# Patient Record
Sex: FEMALE | Race: WHITE | NOT HISPANIC OR LATINO | URBAN - METROPOLITAN AREA
[De-identification: names, ages, dates, MRNs, and addresses within clinical notes are randomized per-mention and may not be internally consistent; named-entity substitution may affect disease eponyms.]

---

## 2017-07-25 ENCOUNTER — EMERGENCY (EMERGENCY)
Facility: HOSPITAL | Age: 22
LOS: 0 days | Discharge: LEFT BEFORE DISCHARGE | End: 2017-07-25
Admitting: EMERGENCY MEDICINE

## 2017-07-25 DIAGNOSIS — J02.9 ACUTE PHARYNGITIS, UNSPECIFIED: ICD-10-CM

## 2017-07-25 DIAGNOSIS — Z87.891 PERSONAL HISTORY OF NICOTINE DEPENDENCE: ICD-10-CM

## 2017-07-25 DIAGNOSIS — R45.0 NERVOUSNESS: ICD-10-CM

## 2017-07-25 DIAGNOSIS — R21 RASH AND OTHER NONSPECIFIC SKIN ERUPTION: ICD-10-CM

## 2017-08-14 ENCOUNTER — EMERGENCY (EMERGENCY)
Facility: HOSPITAL | Age: 22
LOS: 0 days | Discharge: HOME | End: 2017-08-14

## 2017-08-14 DIAGNOSIS — T81.4XXA INFECTION FOLLOWING A PROCEDURE, INITIAL ENCOUNTER: ICD-10-CM

## 2017-08-14 DIAGNOSIS — Z87.891 PERSONAL HISTORY OF NICOTINE DEPENDENCE: ICD-10-CM

## 2017-08-14 DIAGNOSIS — Z97.5 PRESENCE OF (INTRAUTERINE) CONTRACEPTIVE DEVICE: ICD-10-CM

## 2017-08-14 DIAGNOSIS — L53.9 ERYTHEMATOUS CONDITION, UNSPECIFIED: ICD-10-CM
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
## 2017-08-14 DIAGNOSIS — Z98.890 OTHER SPECIFIED POSTPROCEDURAL STATES: ICD-10-CM

## 2019-02-02 ENCOUNTER — EMERGENCY (EMERGENCY)
Facility: HOSPITAL | Age: 24
LOS: 0 days | Discharge: HOME | End: 2019-02-02
Attending: STUDENT IN AN ORGANIZED HEALTH CARE EDUCATION/TRAINING PROGRAM | Admitting: STUDENT IN AN ORGANIZED HEALTH CARE EDUCATION/TRAINING PROGRAM

## 2019-02-02 VITALS
HEART RATE: 94 BPM | SYSTOLIC BLOOD PRESSURE: 120 MMHG | OXYGEN SATURATION: 98 % | RESPIRATION RATE: 16 BRPM | TEMPERATURE: 98 F | DIASTOLIC BLOOD PRESSURE: 70 MMHG

## 2019-02-02 DIAGNOSIS — Z87.42 PERSONAL HISTORY OF OTHER DISEASES OF THE FEMALE GENITAL TRACT: ICD-10-CM
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
## 2019-02-02 DIAGNOSIS — T78.40XA ALLERGY, UNSPECIFIED, INITIAL ENCOUNTER: ICD-10-CM
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
## 2019-02-02 DIAGNOSIS — J34.89 OTHER SPECIFIED DISORDERS OF NOSE AND NASAL SINUSES: ICD-10-CM

## 2019-02-02 RX ORDER — DEXAMETHASONE 0.5 MG/5ML
10 ELIXIR ORAL ONCE
Qty: 0 | Refills: 0 | Status: COMPLETED | OUTPATIENT
Start: 2019-02-02 | End: 2019-02-02

## 2019-02-02 RX ORDER — POLYMYXIN B SULF/TRIMETHOPRIM 10000-1/ML
1 DROPS OPHTHALMIC (EYE) ONCE
Qty: 0 | Refills: 0 | Status: COMPLETED | OUTPATIENT
Start: 2019-02-02 | End: 2019-02-02

## 2019-02-02 RX ORDER — DIPHENHYDRAMINE HCL 50 MG
50 CAPSULE ORAL ONCE
Qty: 0 | Refills: 0 | Status: COMPLETED | OUTPATIENT
Start: 2019-02-02 | End: 2019-02-02

## 2019-02-02 RX ADMIN — Medication 50 MILLIGRAM(S): at 07:43

## 2019-02-02 RX ADMIN — Medication 10 MILLIGRAM(S): at 07:46

## 2019-02-02 RX ADMIN — Medication 1 DROP(S): at 07:48

## 2019-02-02 NOTE — ED PROVIDER NOTE - CARE PROVIDER_API CALL
Armando Díaz)  Ophthalmology  [address] [place] [address]  Phone: [phone]  Fax: [phone]

## 2019-02-02 NOTE — ED PROVIDER NOTE - ATTENDING CONTRIBUTION TO CARE
22 y/o F p/w atraumatic b/l upper eyelid swelling and erythema since this AM, s/p eyelash extension application yesterday.  + itchy feeling.  + white discharge, crusting. No blurry vision.  PERRL, EOMI, no visual changes, NL sclera and conjunctiva; + swelling to eyelids that looks allergic.  IMP: allergic rxn. P: H2 blocker, steroid, recc remove eyelash; abx eyedrops w/ wait and see plan, ophtho fup. Pt understands signs and symptoms for ED return.

## 2019-02-02 NOTE — ED PROVIDER NOTE - NSFOLLOWUPINSTRUCTIONS_ED_ALL_ED_FT
Allergic Reaction    An allergic reaction is an abnormal reaction to a substance (allergen) by the body's defense system. Common allergens include medicines, food, insect bites or stings, and blood products. The body releases certain proteins into the blood that can cause a variety of symptoms such as an itchy rash, wheezing, swelling of the face/lips/tongue/throat, abdominal pain, nausea or vomiting. An allergic reaction is usually treated with medication. If your health care provider prescribed you an epinephrine injection device, make sure to keep it with you at all times.    SEEK IMMEDIATE MEDICAL CARE IF YOU HAVE THE FOLLOWING SYMPTOMS: allergic reaction severe enough that required you to use epinephrine, tightness in your chest, swelling around your lips/tongue/throat, abdominal pain, vomiting or diarrhea, or lightheadedness/dizziness. These symptoms may represent a serious problem that is an emergency. Do not wait to see if the symptoms will go away. Use your auto-injector pen or anaphylaxis kit as you have been instructed, and get medical help right away. Call your local emergency services (911 in the [place] Do not drive yourself to the hospital.

## 2019-02-02 NOTE — ED ADULT NURSE NOTE - NSIMPLEMENTINTERV_GEN_ALL_ED
Implemented All Universal Safety Interventions:  [place] to call system. Call bell, personal items and telephone within reach. Instruct patient to call for assistance. Room bathroom lighting operational. Non-slip footwear when patient is off stretcher. Physically safe environment: no spills, clutter or unnecessary equipment. Stretcher in lowest position, wheels locked, appropriate side rails in place.

## 2019-02-02 NOTE — ED PROVIDER NOTE - NS ED ROS FT
Review of Systems:  	•	CONSTITUTIONAL - no fever, no diaphoresis, no chills  	•	SKIN - no rash  	•	EYES - +eyelid swelling, +bilateral eyelid itchiness, no eye pain, no blurry vision  	•	ENT - +rhinorrhea, no congestion  	•	NEUROLOGIC - no weakness, no headache  	All other ROS are negative except as documented in HPI.

## 2019-02-02 NOTE — ED PROVIDER NOTE - MEDICAL DECISION MAKING DETAILS
24 y/o F p/w atraumatic b/l upper eyelid swelling and erythema since this AM, s/p eyelash extension application yesterday.  + itchy feeling.  + white discharge, crusting. No blurry vision.  PERRL, EOMI, no visual changes, NL sclera and conjunctiva; + swelling to eyelids that looks allergic.  IMP: allergic rxn. P: H2 blocker, steroid, recc remove eyelash; abx eyedrops w/ wait and see plan, ophtho fup. Pt understands signs and symptoms for ED return.

## 2019-02-02 NOTE — ED PROVIDER NOTE - PHYSICAL EXAMINATION
VITAL SIGNS: I have reviewed nursing notes and confirm.  CONSTITUTIONAL: Well-developed; well-nourished; in no acute distress.  SKIN: Skin exam is warm and dry, no acute rash.  HEAD: Normocephalic; atraumatic.  EYES: PERRL, EOM intact; conjunctiva and sclera clear. +Bilateral eyelid swelling with minimal discharge noted.   ENT: No nasal discharge; airway clear. No uvula swelling. No lip swelling  PULM: Clear to auscultation bilaterally. No wheezing, rhonchi, or crackles.   NEURO: Alert, oriented. Grossly unremarkable. No focal deficits.  PSYCH: Cooperative, appropriate.

## 2019-02-02 NOTE — ED PROVIDER NOTE - NSFOLLOWUPCLINICS_GEN_ALL_ED_FT
[facility]  Ophthalmolgy  [address], [address]  [place] [address]  Phone: [phone]  Fax:   Follow Up Time: 1-3 Days

## 2019-02-02 NOTE — ED PROVIDER NOTE - OBJECTIVE STATEMENT
22 yo F with pmhx of endometriosis presenting with bilateral eyelid swelling upon awakening this morning at 4:30am. States she had her eyelashes done yesterday but states that she has been getting them done for 1 year and there was no change in the material used. Reports itchiness and bilateral purulent eye discharge. No fever or chills. No blurry vision or diplopia. No headache. No difficulty breathing. No nausea or vomiting.

## 2019-12-06 NOTE — ED ADULT NURSE NOTE - CHPI ED NUR SYMPTOMS NEG
Size Of Lesion In Cm: 0 Billing Type: Third-Party Bill Hide Accession Number?: No Biopsy Method: curette Dressing: bandage Hemostasis: Pretty's Detail Level: Detailed Anesthesia Type: 1% lidocaine with 1:200,000 epinephrine Wound Care: Vaseline Was A Bandage Applied: Yes Depth Of Biopsy: dermis Type Of Destruction Used: Electrodesiccation and Curettage Anesthesia Volume In Cc (Will Not Render If 0): 1 Biopsy Type: H and E no difficulty breathing/no congestion

## 2021-07-16 ENCOUNTER — ASOB RESULT (OUTPATIENT)
Age: 26
End: 2021-07-16

## 2021-07-16 ENCOUNTER — APPOINTMENT (OUTPATIENT)
Dept: ANTEPARTUM | Facility: CLINIC | Age: 26
End: 2021-07-16
Payer: COMMERCIAL

## 2021-07-16 ENCOUNTER — OUTPATIENT (OUTPATIENT)
Dept: OUTPATIENT SERVICES | Facility: HOSPITAL | Age: 26
LOS: 1 days | Discharge: HOME | End: 2021-07-16

## 2021-07-16 PROCEDURE — 76813 OB US NUCHAL MEAS 1 GEST: CPT | Mod: 26

## 2021-07-16 PROCEDURE — 99212 OFFICE O/P EST SF 10 MIN: CPT | Mod: 25

## 2021-07-20 DIAGNOSIS — Z3A.13 13 WEEKS GESTATION OF PREGNANCY: ICD-10-CM

## 2021-07-20 DIAGNOSIS — Z36.89 ENCOUNTER FOR OTHER SPECIFIED ANTENATAL SCREENING: ICD-10-CM

## 2021-07-20 DIAGNOSIS — Z36.82 ENCOUNTER FOR ANTENATAL SCREENING FOR NUCHAL TRANSLUCENCY: ICD-10-CM

## 2021-07-20 DIAGNOSIS — O35.9XX1 MATERNAL CARE FOR (SUSPECTED) FETAL ABNORMALITY AND DAMAGE, UNSPECIFIED, FETUS 1: ICD-10-CM

## 2021-07-20 DIAGNOSIS — Z36.3 ENCOUNTER FOR ANTENATAL SCREENING FOR MALFORMATIONS: ICD-10-CM

## 2021-09-09 ENCOUNTER — APPOINTMENT (OUTPATIENT)
Dept: ANTEPARTUM | Facility: CLINIC | Age: 26
End: 2021-09-09
Payer: COMMERCIAL

## 2021-09-09 ENCOUNTER — OUTPATIENT (OUTPATIENT)
Dept: OUTPATIENT SERVICES | Facility: HOSPITAL | Age: 26
LOS: 1 days | Discharge: HOME | End: 2021-09-09

## 2021-09-09 ENCOUNTER — ASOB RESULT (OUTPATIENT)
Age: 26
End: 2021-09-09

## 2021-09-09 DIAGNOSIS — Z36.3 ENCOUNTER FOR ANTENATAL SCREENING FOR MALFORMATIONS: ICD-10-CM

## 2021-09-09 DIAGNOSIS — Z3A.20 20 WEEKS GESTATION OF PREGNANCY: ICD-10-CM

## 2021-09-09 PROCEDURE — 76817 TRANSVAGINAL US OBSTETRIC: CPT | Mod: 26

## 2021-09-09 PROCEDURE — 76805 OB US >/= 14 WKS SNGL FETUS: CPT | Mod: 26

## 2021-09-20 ENCOUNTER — ASOB RESULT (OUTPATIENT)
Age: 26
End: 2021-09-20

## 2021-09-20 ENCOUNTER — APPOINTMENT (OUTPATIENT)
Dept: ANTEPARTUM | Facility: CLINIC | Age: 26
End: 2021-09-20
Payer: COMMERCIAL

## 2021-09-20 ENCOUNTER — OUTPATIENT (OUTPATIENT)
Dept: OUTPATIENT SERVICES | Facility: HOSPITAL | Age: 26
LOS: 1 days | Discharge: HOME | End: 2021-09-20

## 2021-09-20 PROCEDURE — 76816 OB US FOLLOW-UP PER FETUS: CPT | Mod: 26

## 2021-12-03 ENCOUNTER — ASOB RESULT (OUTPATIENT)
Age: 26
End: 2021-12-03

## 2021-12-03 ENCOUNTER — APPOINTMENT (OUTPATIENT)
Dept: ANTEPARTUM | Facility: CLINIC | Age: 26
End: 2021-12-03
Payer: COMMERCIAL

## 2021-12-03 ENCOUNTER — OUTPATIENT (OUTPATIENT)
Dept: OUTPATIENT SERVICES | Facility: HOSPITAL | Age: 26
LOS: 1 days | Discharge: HOME | End: 2021-12-03

## 2021-12-03 PROCEDURE — 76816 OB US FOLLOW-UP PER FETUS: CPT | Mod: 26

## 2021-12-06 DIAGNOSIS — Z3A.32 32 WEEKS GESTATION OF PREGNANCY: ICD-10-CM

## 2021-12-06 DIAGNOSIS — O26.849 UTERINE SIZE-DATE DISCREPANCY, UNSPECIFIED TRIMESTER: ICD-10-CM

## 2021-12-06 DIAGNOSIS — Z36.89 ENCOUNTER FOR OTHER SPECIFIED ANTENATAL SCREENING: ICD-10-CM

## 2021-12-15 ENCOUNTER — TRANSCRIPTION ENCOUNTER (OUTPATIENT)
Age: 26
End: 2021-12-15

## 2022-02-03 ENCOUNTER — INPATIENT (INPATIENT)
Facility: HOSPITAL | Age: 27
LOS: 2 days | Discharge: HOME | End: 2022-02-06
Attending: OBSTETRICS & GYNECOLOGY | Admitting: OBSTETRICS & GYNECOLOGY
Payer: COMMERCIAL

## 2022-02-03 VITALS
WEIGHT: 160.94 LBS | DIASTOLIC BLOOD PRESSURE: 75 MMHG | RESPIRATION RATE: 16 BRPM | HEIGHT: 63 IN | OXYGEN SATURATION: 100 % | SYSTOLIC BLOOD PRESSURE: 115 MMHG | TEMPERATURE: 98 F | HEART RATE: 83 BPM

## 2022-02-03 DIAGNOSIS — Z98.890 OTHER SPECIFIED POSTPROCEDURAL STATES: Chronic | ICD-10-CM

## 2022-02-03 LAB
APPEARANCE UR: ABNORMAL
BACTERIA # UR AUTO: ABNORMAL
BASOPHILS # BLD AUTO: 0.07 K/UL — SIGNIFICANT CHANGE UP (ref 0–0.2)
BASOPHILS NFR BLD AUTO: 0.4 % — SIGNIFICANT CHANGE UP (ref 0–1)
BILIRUB UR-MCNC: NEGATIVE — SIGNIFICANT CHANGE UP
BLD GP AB SCN SERPL QL: SIGNIFICANT CHANGE UP
COLOR SPEC: SIGNIFICANT CHANGE UP
DIFF PNL FLD: NEGATIVE — SIGNIFICANT CHANGE UP
EOSINOPHIL # BLD AUTO: 0.23 K/UL — SIGNIFICANT CHANGE UP (ref 0–0.7)
EOSINOPHIL NFR BLD AUTO: 1.5 % — SIGNIFICANT CHANGE UP (ref 0–8)
EPI CELLS # UR: 1 /HPF — SIGNIFICANT CHANGE UP (ref 0–5)
GLUCOSE UR QL: NEGATIVE — SIGNIFICANT CHANGE UP
HCT VFR BLD CALC: 28.2 % — LOW (ref 37–47)
HGB BLD-MCNC: 9 G/DL — LOW (ref 12–16)
HIV 1 & 2 AB SERPL IA.RAPID: SIGNIFICANT CHANGE UP
HYALINE CASTS # UR AUTO: 3 /LPF — SIGNIFICANT CHANGE UP (ref 0–7)
IMM GRANULOCYTES NFR BLD AUTO: 0.8 % — HIGH (ref 0.1–0.3)
KETONES UR-MCNC: NEGATIVE — SIGNIFICANT CHANGE UP
LEUKOCYTE ESTERASE UR-ACNC: NEGATIVE — SIGNIFICANT CHANGE UP
LYMPHOCYTES # BLD AUTO: 16.6 % — LOW (ref 20.5–51.1)
LYMPHOCYTES # BLD AUTO: 2.61 K/UL — SIGNIFICANT CHANGE UP (ref 1.2–3.4)
MCHC RBC-ENTMCNC: 25.9 PG — LOW (ref 27–31)
MCHC RBC-ENTMCNC: 31.9 G/DL — LOW (ref 32–37)
MCV RBC AUTO: 81.3 FL — SIGNIFICANT CHANGE UP (ref 81–99)
MONOCYTES # BLD AUTO: 1.21 K/UL — HIGH (ref 0.1–0.6)
MONOCYTES NFR BLD AUTO: 7.7 % — SIGNIFICANT CHANGE UP (ref 1.7–9.3)
NEUTROPHILS # BLD AUTO: 11.53 K/UL — HIGH (ref 1.4–6.5)
NEUTROPHILS NFR BLD AUTO: 73 % — SIGNIFICANT CHANGE UP (ref 42.2–75.2)
NITRITE UR-MCNC: NEGATIVE — SIGNIFICANT CHANGE UP
NRBC # BLD: 0 /100 WBCS — SIGNIFICANT CHANGE UP (ref 0–0)
PH UR: 6.5 — SIGNIFICANT CHANGE UP (ref 5–8)
PLATELET # BLD AUTO: 461 K/UL — HIGH (ref 130–400)
PRENATAL SYPHILIS TEST: SIGNIFICANT CHANGE UP
PROT UR-MCNC: NEGATIVE — SIGNIFICANT CHANGE UP
RBC # BLD: 3.47 M/UL — LOW (ref 4.2–5.4)
RBC # FLD: 13.2 % — SIGNIFICANT CHANGE UP (ref 11.5–14.5)
RBC CASTS # UR COMP ASSIST: 2 /HPF — SIGNIFICANT CHANGE UP (ref 0–4)
SARS-COV-2 RNA SPEC QL NAA+PROBE: SIGNIFICANT CHANGE UP
SP GR SPEC: 1.01 — SIGNIFICANT CHANGE UP (ref 1.01–1.03)
UROBILINOGEN FLD QL: SIGNIFICANT CHANGE UP
WBC # BLD: 15.77 K/UL — HIGH (ref 4.8–10.8)
WBC # FLD AUTO: 15.77 K/UL — HIGH (ref 4.8–10.8)
WBC UR QL: 24 /HPF — HIGH (ref 0–5)

## 2022-02-03 RX ORDER — SODIUM CHLORIDE 9 MG/ML
1000 INJECTION, SOLUTION INTRAVENOUS
Refills: 0 | Status: DISCONTINUED | OUTPATIENT
Start: 2022-02-03 | End: 2022-02-04

## 2022-02-03 RX ORDER — INFLUENZA VIRUS VACCINE 15; 15; 15; 15 UG/.5ML; UG/.5ML; UG/.5ML; UG/.5ML
0.5 SUSPENSION INTRAMUSCULAR ONCE
Refills: 0 | Status: DISCONTINUED | OUTPATIENT
Start: 2022-02-03 | End: 2022-02-04

## 2022-02-03 RX ORDER — OXYTOCIN 10 UNIT/ML
333.33 VIAL (ML) INJECTION
Qty: 20 | Refills: 0 | Status: DISCONTINUED | OUTPATIENT
Start: 2022-02-03 | End: 2022-02-04

## 2022-02-03 RX ORDER — DINOPROSTONE 10 MG/241MG
10 INSERT VAGINAL ONCE
Refills: 0 | Status: COMPLETED | OUTPATIENT
Start: 2022-02-03 | End: 2022-02-03

## 2022-02-03 RX ADMIN — SODIUM CHLORIDE 125 MILLILITER(S): 9 INJECTION, SOLUTION INTRAVENOUS at 20:45

## 2022-02-03 RX ADMIN — DINOPROSTONE 10 MILLIGRAM(S): 10 INSERT VAGINAL at 23:12

## 2022-02-03 NOTE — OB PROVIDER H&P - NS_OBGYNHISTORY_OBGYN_ALL_OB_FT
OB:   GYN: denies history of fibroids, ovarian cysts, and STIs. Remote h/o abnormal pap s/p colposcopy, last papsmear wnl. H/o endometriosis with laparoscopy.

## 2022-02-03 NOTE — OB PROVIDER H&P - ASSESSMENT
26y  at 41w4d, GBS neg, IOL for post dates  -Admit to L+D  -Monitor EFM and TOCO   -IVF and labs  -Pain control PRN  -Clear liquid diet as tolerated    Dr. Torres and Dr. Allen to be aware

## 2022-02-03 NOTE — PROGRESS NOTE ADULT - SUBJECTIVE AND OBJECTIVE BOX
PGY1 Progress Note    Patient seen and examined at bedside, no current complaints.  S/p cervidil placement      Vital Signs Last 24 Hrs  T(C): 36.5 (03 Feb 2022 20:32), Max: 36.5 (03 Feb 2022 20:32)  T(F): 97.7 (03 Feb 2022 20:32), Max: 97.7 (03 Feb 2022 20:32)  HR: 83 (03 Feb 2022 20:32) (83 - 83)  BP: 115/75 (03 Feb 2022 20:32) (115/75 - 115/75)  RR: 16 (03 Feb 2022 20:32) (16 - 16)  SpO2: 100% (03 Feb 2022 20:32) (100% - 100%)    EFM: 140BPM/mod booker/accels pos  TOCO: q2-8min  SVE: 1/0/-3    Medications:  Cervidil @ 2140    Labs:

## 2022-02-03 NOTE — OB PROVIDER H&P - NSHPPHYSICALEXAM_GEN_ALL_CORE
T(C): 36.5 (02-03-22 @ 20:32), Max: 36.5 (02-03-22 @ 20:32)  HR: 83 (02-03-22 @ 20:32) (83 - 83)  BP: 115/75 (02-03-22 @ 20:32) (115/75 - 115/75)  RR: 16 (02-03-22 @ 20:32) (16 - 16)  SpO2: 100% (02-03-22 @ 20:32) (100% - 100%)  BMI (kg/m2): 28.5 (02-03-22 @ 20:32)    Gen: A+OX3. NAD  Abd: Soft, Nontender. Gravid.  SVE:  per      FHR:   TOCO:       EFW by Leopolds: T(C): 36.5 (02-03-22 @ 20:32), Max: 36.5 (02-03-22 @ 20:32)  HR: 83 (02-03-22 @ 20:32) (83 - 83)  BP: 115/75 (02-03-22 @ 20:32) (115/75 - 115/75)  RR: 16 (02-03-22 @ 20:32) (16 - 16)  SpO2: 100% (02-03-22 @ 20:32) (100% - 100%)  BMI (kg/m2): 28.5 (02-03-22 @ 20:32)    Gen: A+OX3. NAD  Abd: Soft, Nontender. Gravid.  SVE: 1/0/-3    FHR: 140BPM/mod booker/accels pos   TOCO: irregular      EFW by Leopolds: 3600

## 2022-02-03 NOTE — OB PROVIDER H&P - HISTORY OF PRESENT ILLNESS
26y  at 41w4d dated by second trimester sono with JESSIKA 23 presenting for scheduled IOL for post dates. Reports mild irregular contractions. Denies LOF, VB. Good FM. Denies complications this pregnancy. GBS neg.

## 2022-02-03 NOTE — PROGRESS NOTE ADULT - ASSESSMENT
A/P:   25yo  at 41w4d, GBS neg, no complications, IOL at late term   - current management  - continuous toco/EFM  - pain management PRN    Dr. Allen and Dr. Torres aware

## 2022-02-03 NOTE — OB RN PATIENT PROFILE - FALL HARM RISK - UNIVERSAL INTERVENTIONS
Bed in lowest position, wheels locked, appropriate side rails in place/Call bell, personal items and telephone in reach/Instruct patient to call for assistance before getting out of bed or chair/Non-slip footwear when patient is out of bed/[place] to call system/Physically safe environment - no spills, clutter or unnecessary equipment/Purposeful Proactive Rounding/Room/bathroom lighting operational, light cord in reach

## 2022-02-04 LAB
AMPHET UR-MCNC: NEGATIVE — SIGNIFICANT CHANGE UP
BARBITURATES UR SCN-MCNC: NEGATIVE — SIGNIFICANT CHANGE UP
BENZODIAZ UR-MCNC: NEGATIVE — SIGNIFICANT CHANGE UP
BUPRENORPHINE SCREEN, URINE RESULT: NEGATIVE — SIGNIFICANT CHANGE UP
COCAINE METAB.OTHER UR-MCNC: NEGATIVE — SIGNIFICANT CHANGE UP
FENTANYL UR QL: NEGATIVE — SIGNIFICANT CHANGE UP
L&D DRUG SCREEN, URINE: SIGNIFICANT CHANGE UP
METHADONE UR-MCNC: NEGATIVE — SIGNIFICANT CHANGE UP
OPIATES UR-MCNC: NEGATIVE — SIGNIFICANT CHANGE UP
OXYCODONE UR-MCNC: NEGATIVE — SIGNIFICANT CHANGE UP
PCP UR-MCNC: NEGATIVE — SIGNIFICANT CHANGE UP
PROPOXYPHENE QUALITATIVE URINE RESULT: NEGATIVE — SIGNIFICANT CHANGE UP

## 2022-02-04 RX ORDER — PRAMOXINE HYDROCHLORIDE 150 MG/15G
1 AEROSOL, FOAM RECTAL EVERY 4 HOURS
Refills: 0 | Status: DISCONTINUED | OUTPATIENT
Start: 2022-02-04 | End: 2022-02-06

## 2022-02-04 RX ORDER — ONDANSETRON 8 MG/1
4 TABLET, FILM COATED ORAL EVERY 6 HOURS
Refills: 0 | Status: DISCONTINUED | OUTPATIENT
Start: 2022-02-04 | End: 2022-02-04

## 2022-02-04 RX ORDER — MAGNESIUM HYDROXIDE 400 MG/1
30 TABLET, CHEWABLE ORAL
Refills: 0 | Status: DISCONTINUED | OUTPATIENT
Start: 2022-02-04 | End: 2022-02-06

## 2022-02-04 RX ORDER — IBUPROFEN 200 MG
600 TABLET ORAL EVERY 6 HOURS
Refills: 0 | Status: COMPLETED | OUTPATIENT
Start: 2022-02-04 | End: 2023-01-03

## 2022-02-04 RX ORDER — KETOROLAC TROMETHAMINE 30 MG/ML
30 SYRINGE (ML) INJECTION ONCE
Refills: 0 | Status: DISCONTINUED | OUTPATIENT
Start: 2022-02-04 | End: 2022-02-04

## 2022-02-04 RX ORDER — DEXAMETHASONE 0.5 MG/5ML
4 ELIXIR ORAL EVERY 6 HOURS
Refills: 0 | Status: DISCONTINUED | OUTPATIENT
Start: 2022-02-04 | End: 2022-02-04

## 2022-02-04 RX ORDER — ACETAMINOPHEN 500 MG
975 TABLET ORAL
Refills: 0 | Status: DISCONTINUED | OUTPATIENT
Start: 2022-02-04 | End: 2022-02-06

## 2022-02-04 RX ORDER — IBUPROFEN 200 MG
600 TABLET ORAL EVERY 6 HOURS
Refills: 0 | Status: DISCONTINUED | OUTPATIENT
Start: 2022-02-04 | End: 2022-02-06

## 2022-02-04 RX ORDER — DIBUCAINE 1 %
1 OINTMENT (GRAM) RECTAL EVERY 6 HOURS
Refills: 0 | Status: DISCONTINUED | OUTPATIENT
Start: 2022-02-04 | End: 2022-02-06

## 2022-02-04 RX ORDER — HYDROCORTISONE 1 %
1 OINTMENT (GRAM) TOPICAL EVERY 6 HOURS
Refills: 0 | Status: DISCONTINUED | OUTPATIENT
Start: 2022-02-04 | End: 2022-02-06

## 2022-02-04 RX ORDER — AER TRAVELER 0.5 G/1
1 SOLUTION RECTAL; TOPICAL EVERY 4 HOURS
Refills: 0 | Status: DISCONTINUED | OUTPATIENT
Start: 2022-02-04 | End: 2022-02-06

## 2022-02-04 RX ORDER — DIPHENHYDRAMINE HCL 50 MG
25 CAPSULE ORAL EVERY 6 HOURS
Refills: 0 | Status: DISCONTINUED | OUTPATIENT
Start: 2022-02-04 | End: 2022-02-06

## 2022-02-04 RX ORDER — OXYTOCIN 10 UNIT/ML
333.33 VIAL (ML) INJECTION
Qty: 20 | Refills: 0 | Status: DISCONTINUED | OUTPATIENT
Start: 2022-02-04 | End: 2022-02-06

## 2022-02-04 RX ORDER — OXYCODONE HYDROCHLORIDE 5 MG/1
5 TABLET ORAL
Refills: 0 | Status: DISCONTINUED | OUTPATIENT
Start: 2022-02-04 | End: 2022-02-06

## 2022-02-04 RX ORDER — SIMETHICONE 80 MG/1
80 TABLET, CHEWABLE ORAL EVERY 4 HOURS
Refills: 0 | Status: DISCONTINUED | OUTPATIENT
Start: 2022-02-04 | End: 2022-02-06

## 2022-02-04 RX ORDER — NALOXONE HYDROCHLORIDE 4 MG/.1ML
0.1 SPRAY NASAL
Refills: 0 | Status: DISCONTINUED | OUTPATIENT
Start: 2022-02-04 | End: 2022-02-04

## 2022-02-04 RX ORDER — BENZOCAINE 10 %
1 GEL (GRAM) MUCOUS MEMBRANE EVERY 6 HOURS
Refills: 0 | Status: DISCONTINUED | OUTPATIENT
Start: 2022-02-04 | End: 2022-02-06

## 2022-02-04 RX ORDER — OXYCODONE HYDROCHLORIDE 5 MG/1
5 TABLET ORAL ONCE
Refills: 0 | Status: DISCONTINUED | OUTPATIENT
Start: 2022-02-04 | End: 2022-02-06

## 2022-02-04 RX ORDER — FENTANYL/BUPIVACAINE/NS/PF 2MCG/ML-.1
250 PLASTIC BAG, INJECTION (ML) INJECTION
Refills: 0 | Status: DISCONTINUED | OUTPATIENT
Start: 2022-02-04 | End: 2022-02-04

## 2022-02-04 RX ORDER — LANOLIN
1 OINTMENT (GRAM) TOPICAL EVERY 6 HOURS
Refills: 0 | Status: DISCONTINUED | OUTPATIENT
Start: 2022-02-04 | End: 2022-02-06

## 2022-02-04 RX ORDER — OXYTOCIN 10 UNIT/ML
2 VIAL (ML) INJECTION
Qty: 30 | Refills: 0 | Status: DISCONTINUED | OUTPATIENT
Start: 2022-02-04 | End: 2022-02-04

## 2022-02-04 RX ORDER — TETANUS TOXOID, REDUCED DIPHTHERIA TOXOID AND ACELLULAR PERTUSSIS VACCINE, ADSORBED 5; 2.5; 8; 8; 2.5 [IU]/.5ML; [IU]/.5ML; UG/.5ML; UG/.5ML; UG/.5ML
0.5 SUSPENSION INTRAMUSCULAR ONCE
Refills: 0 | Status: DISCONTINUED | OUTPATIENT
Start: 2022-02-04 | End: 2022-02-06

## 2022-02-04 RX ORDER — SODIUM CHLORIDE 9 MG/ML
3 INJECTION INTRAMUSCULAR; INTRAVENOUS; SUBCUTANEOUS EVERY 8 HOURS
Refills: 0 | Status: DISCONTINUED | OUTPATIENT
Start: 2022-02-04 | End: 2022-02-06

## 2022-02-04 RX ADMIN — Medication 600 MILLIGRAM(S): at 23:11

## 2022-02-04 RX ADMIN — Medication 1000 MILLIUNIT(S)/MIN: at 21:10

## 2022-02-04 RX ADMIN — Medication 30 MILLIGRAM(S): at 20:40

## 2022-02-04 RX ADMIN — Medication 2 MILLIUNIT(S)/MIN: at 11:00

## 2022-02-04 NOTE — PROGRESS NOTE ADULT - ASSESSMENT
27yo  at 41w5d, GBS neg, IOL for late term, s/p cervidil, s/p epidural, pitocin discontinued, doing well.     - Cont EFM/[place]  - IV Hydration  - Pain Management prn s/p epidural  - Monitor vitals  - Clear Liquids  - Restart pitocin when cat I tracing for >20 min    Dr Baugh and Dr Allen aware.

## 2022-02-04 NOTE — OB PROVIDER DELIVERY SUMMARY - NSPROVIDERDELIVERYNOTE_OBGYN_ALL_OB_FT
Patient was fully dilated and pushing. Head delivered OA, restituted to TEDDY, nuchal cordx2 reduced without difficulty, Anterior shoulder delivered, followed by the posterior shoulder, rest of the body without complications. Baby suctioned, cord clamped and cut, and infant placed on mothers abdomen. Cord blood collected, followed by Cord segment and blood. Placenta delivered, intact. Fundus massaged and firm, with good hemostasis. Pitocin given postpartum. Vaginal vault, perineum, and cervix inspected, and patient found to be intact. Live male infant delivered APGARs 9/9, weighing 3239gms.    Dr. Allen at bedside

## 2022-02-04 NOTE — OB PROVIDER DELIVERY SUMMARY - NSSELHIDDEN_OBGYN_ALL_OB_FT
[NS_DeliveryAttending1_OBGYN_ALL_OB_FT:[id number]=],[NS_DeliveryAssist1_OBGYN_ALL_OB_FT:[id number]]

## 2022-02-04 NOTE — PROGRESS NOTE ADULT - SUBJECTIVE AND OBJECTIVE BOX
PGY1 Note    S: Patient seen and examined at bedside. Doing well, pain well tolerated at this time s/p epidural. Feels intermittent anterior pelvic pressure.     Vital Signs Last 24 Hrs  T(C): 36.6 (2022 01:39), Max: 36.6 (2022 01:39)  T(F): 97.8 (2022 01:39), Max: 97.8 (2022 01:39)  HR: 88 (2022 12:10) (70 - 100)  BP: 109/68 (2022 12:01) (89/54 - 122/68)  RR: 18 (2022 09:44) (16 - 18)  SpO2: 98% (2022 12:10) (93% - 100%)    EFM: 130/mod booker/pos accel/ intermittent early and booker decel --> patient repositioned from left and right lateral decubitus on to back, with IVF bolus, pitocin discontinued   TOCO: q2-4 min  SVE:  3/80/-2, vtx, ruptured clear    Labs:                        9.0    15.77 )-----------( 461      ( 2022 21:54 )             28.2       ABO RH Interpretation: B POS (22 @ 21:50)    Urinalysis Basic - ( 2022 21:59 )  Color: Light Yellow / Appearance: Slightly Turbid / S.011 / pH: x  Gluc: x / Ketone: Negative  / Bili: Negative / Urobili: <2 mg/dL   Blood: x / Protein: Negative / Nitrite: Negative   Leuk Esterase: Negative / RBC: 2 /HPF / WBC 24 /HPF   Sq Epi: x / Non Sq Epi: 1 /HPF / Bacteria: Few    Prenatal Syphilis Test: Nonreact (22 @ 21:59)    UDS: negative  Covid: negative    Meds:  Epi: @0935  Pitocin: @1047, at 4 mu/min --> discontinued at this time due to cat ii tracing  Cervidil: @2140

## 2022-02-04 NOTE — PROGRESS NOTE ADULT - SUBJECTIVE AND OBJECTIVE BOX
PGY2 Note    Patient seen at bedside for evaluation of labor progression, doing well, no complaints. Variable decelerations noted, patient repositioned, currently on right lateral, IV bolus given. Will continue resuscitative measures    T(F): 98.06 (22 @ 17:03), Max: 98.06 (22 @ 17:03)  HR: 86 (22 @ 17:19) (70 - 113)  BP: 98/57 (22 @ 17:19) (89/54 - 122/68)  RR: 18 (22 @ 17:03) (16 - 18)  SpO2: 89% (22 @ 17:00) (88% - 100%)    EFM: 135/mod varaibility/ no accels  TOCO: q6mins  SVE: /-1 @1357    Medications:  dinoprostone Insert: 10 milliGRAM(s) (22 @ 23:12)  lactated ringers.: 125 mL/Hr (22 @ 20:36)  oxytocin Infusion.: 2 mL/Hr (22 @ 10:47)      Labs:                        9.0    15.77 )-----------( 461      ( 2022 21:54 )             28.2           ABO RH Interpretation: B POS (22 @ 21:50)    Antibody Screen: NEG (22 @ 21:50)    Urinalysis Basic - ( 2022 21:59 )    Color: Light Yellow / Appearance: Slightly Turbid / S.011 / pH: x  Gluc: x / Ketone: Negative  / Bili: Negative / Urobili: <2 mg/dL   Blood: x / Protein: Negative / Nitrite: Negative   Leuk Esterase: Negative / RBC: 2 /HPF / WBC 24 /HPF   Sq Epi: x / Non Sq Epi: 1 /HPF / Bacteria: Few      L&amp;D Drug Screen, Urine: Done (22 @ 21:52)    Prenatal Syphilis Test: Nonreact (22 @ 21:59)

## 2022-02-04 NOTE — PROGRESS NOTE ADULT - ASSESSMENT
27yo  at 41w5d, GBS neg, IOL for late term, s/p cervidil, s/p epidural, fully dilated and tried pushing, instructed to bear down     -Continue current management   -Pain management prn  -Continuous EFM/toco  -Reevaluate      and  aware

## 2022-02-04 NOTE — PROCEDURE NOTE - ADDITIONAL PROCEDURE DETAILS
Post Procedure patient evaluated at bedside. Hemodynamically stable, degree of motor block appropriate for epidural medication administered. Pain is well controlled bilaterally. Patient is aware that the anesthesia team is available 24/7, in case she needs more pain medication or has any other anesthesia-related needs or questions.   .1% Bupivacaine +2ucg/cc Fentanyl epidural infusion at 15ml/hr Post Procedure patient evaluated at bedside. Hemodynamically stable, degree of motor block appropriate for epidural medication administered. Patient is aware that the anesthesia team is available 24/7, in case she needs more pain medication or has any other anesthesia-related needs or questions.   .1% Bupivacaine +2ucg/cc Fentanyl epidural infusion at 15ml/hr    0955 Top off 0.125% Bupivacaine 10 ml given in small increments after negative aspiration, VSS, FHR wnl. Post Procedure patient evaluated at bedside. Hemodynamically stable, degree of motor block appropriate for epidural medication administered. Patient is aware that the anesthesia team is available 24/7, in case she needs more pain medication or has any other anesthesia-related needs or questions.   .1% Bupivacaine +2ucg/cc Fentanyl epidural infusion at 15ml/hr    0955 Top off 0.125% Bupivacaine 10 ml given in small increments after negative aspiration, VSS, FHR wnl.    1015 Patient reassessed, still c/o pain 6/10. Top off 0.25% Bupivacaine 5 ml given in small increments after negative aspiration, VSS, FHR wnl.    1040 Post Top OFF Patient evaluated at bedside. Hemodynamically stable, degree of motor block appropriate for epidural medication administered. Pain is well controlled bilaterally. Patient is aware that the anesthesia team is available 24/7, in case she needs more pain medication or has any other anesthesia-related needs or questions.   .1% Bupivacaine +2ucg/cc Fentanyl epidural infusion at 15ml/hr Post Procedure patient evaluated at bedside. Hemodynamically stable, degree of motor block appropriate for epidural medication administered. Patient is aware that the anesthesia team is available 24/7, in case she needs more pain medication or has any other anesthesia-related needs or questions.   .1% Bupivacaine +2ucg/cc Fentanyl epidural infusion at 15ml/hr    0955 Top off 0.125% Bupivacaine 10 ml given in small increments after negative aspiration, VSS, FHR wnl.    1015 Patient reassessed, still c/o pain 6/10. Top off 0.25% Bupivacaine 5 ml given in small increments after negative aspiration, VSS, FHR wnl.    1040 Post Top OFF Patient evaluated at bedside. Hemodynamically stable, degree of motor block appropriate for epidural medication administered. Pain is well controlled bilaterally. Patient is aware that the anesthesia team is available 24/7, in case she needs more pain medication or has any other anesthesia-related needs or questions.   .1% Bupivacaine +2ucg/cc Fentanyl epidural infusion at 15ml/hr    1405  Top off 0.25% Bupivacaine 7 ml given in small increments after negative aspiration, VSS, FHR wnl. Post Procedure patient evaluated at bedside. Hemodynamically stable, degree of motor block appropriate for epidural medication administered. Patient is aware that the anesthesia team is available 24/7, in case she needs more pain medication or has any other anesthesia-related needs or questions.   .1% Bupivacaine +2ucg/cc Fentanyl epidural infusion at 15ml/hr    0955 Top off 0.125% Bupivacaine 10 ml given in small increments after negative aspiration, VSS, FHR wnl.    1015 Patient reassessed, still c/o pain 6/10. Top off 0.25% Bupivacaine 5 ml given in small increments after negative aspiration, VSS, FHR wnl.    1040 Post Top OFF Patient evaluated at bedside. Hemodynamically stable, degree of motor block appropriate for epidural medication administered. Pain is well controlled bilaterally. Patient is aware that the anesthesia team is available 24/7, in case she needs more pain medication or has any other anesthesia-related needs or questions.   .1% Bupivacaine +2ucg/cc Fentanyl epidural infusion at 15ml/hr    1405  Top off 0.25% Bupivacaine 7 ml given in small increments after negative aspiration, VSS, FHR wnl.    1555 Top off 0.25% Bupivacaine 7 ml given in small increments after negative aspiration, VSS, FHR wnl.

## 2022-02-04 NOTE — PROGRESS NOTE ADULT - ASSESSMENT
27yo  at 41w5d, GBS neg, IOL for late term, s/p cervidil,   - Cont EFM/[place]  - IV Hydration  - Pain Management prn s/p epidural  - Monitor vitals  - Clear Liquids  - Restart pitocin when cat I tracing for >20 min  - continue resuscitative measures    Dr. Ramirez and Dr. Allen aware

## 2022-02-05 LAB
BASOPHILS # BLD AUTO: 0.06 K/UL — SIGNIFICANT CHANGE UP (ref 0–0.2)
BASOPHILS # BLD AUTO: 0.07 K/UL — SIGNIFICANT CHANGE UP (ref 0–0.2)
BASOPHILS NFR BLD AUTO: 0.3 % — SIGNIFICANT CHANGE UP (ref 0–1)
BASOPHILS NFR BLD AUTO: 0.3 % — SIGNIFICANT CHANGE UP (ref 0–1)
EOSINOPHIL # BLD AUTO: 0.15 K/UL — SIGNIFICANT CHANGE UP (ref 0–0.7)
EOSINOPHIL # BLD AUTO: 0.35 K/UL — SIGNIFICANT CHANGE UP (ref 0–0.7)
EOSINOPHIL NFR BLD AUTO: 0.6 % — SIGNIFICANT CHANGE UP (ref 0–8)
EOSINOPHIL NFR BLD AUTO: 1.7 % — SIGNIFICANT CHANGE UP (ref 0–8)
HCT VFR BLD CALC: 23.2 % — LOW (ref 37–47)
HCT VFR BLD CALC: 23.3 % — LOW (ref 37–47)
HGB BLD-MCNC: 7.3 G/DL — LOW (ref 12–16)
HGB BLD-MCNC: 7.5 G/DL — LOW (ref 12–16)
IMM GRANULOCYTES NFR BLD AUTO: 0.5 % — HIGH (ref 0.1–0.3)
IMM GRANULOCYTES NFR BLD AUTO: 0.8 % — HIGH (ref 0.1–0.3)
LYMPHOCYTES # BLD AUTO: 10.6 % — LOW (ref 20.5–51.1)
LYMPHOCYTES # BLD AUTO: 15.6 % — LOW (ref 20.5–51.1)
LYMPHOCYTES # BLD AUTO: 2.57 K/UL — SIGNIFICANT CHANGE UP (ref 1.2–3.4)
LYMPHOCYTES # BLD AUTO: 3.16 K/UL — SIGNIFICANT CHANGE UP (ref 1.2–3.4)
MCHC RBC-ENTMCNC: 26.1 PG — LOW (ref 27–31)
MCHC RBC-ENTMCNC: 26.7 PG — LOW (ref 27–31)
MCHC RBC-ENTMCNC: 31.3 G/DL — LOW (ref 32–37)
MCHC RBC-ENTMCNC: 32.3 G/DL — SIGNIFICANT CHANGE UP (ref 32–37)
MCV RBC AUTO: 82.6 FL — SIGNIFICANT CHANGE UP (ref 81–99)
MCV RBC AUTO: 83.2 FL — SIGNIFICANT CHANGE UP (ref 81–99)
MONOCYTES # BLD AUTO: 1.35 K/UL — HIGH (ref 0.1–0.6)
MONOCYTES # BLD AUTO: 1.8 K/UL — HIGH (ref 0.1–0.6)
MONOCYTES NFR BLD AUTO: 6.7 % — SIGNIFICANT CHANGE UP (ref 1.7–9.3)
MONOCYTES NFR BLD AUTO: 7.5 % — SIGNIFICANT CHANGE UP (ref 1.7–9.3)
NEUTROPHILS # BLD AUTO: 15.21 K/UL — HIGH (ref 1.4–6.5)
NEUTROPHILS # BLD AUTO: 19.37 K/UL — HIGH (ref 1.4–6.5)
NEUTROPHILS NFR BLD AUTO: 75.2 % — SIGNIFICANT CHANGE UP (ref 42.2–75.2)
NEUTROPHILS NFR BLD AUTO: 80.2 % — HIGH (ref 42.2–75.2)
NRBC # BLD: 0 /100 WBCS — SIGNIFICANT CHANGE UP (ref 0–0)
NRBC # BLD: 0 /100 WBCS — SIGNIFICANT CHANGE UP (ref 0–0)
PLATELET # BLD AUTO: 378 K/UL — SIGNIFICANT CHANGE UP (ref 130–400)
PLATELET # BLD AUTO: 398 K/UL — SIGNIFICANT CHANGE UP (ref 130–400)
RBC # BLD: 2.8 M/UL — LOW (ref 4.2–5.4)
RBC # BLD: 2.81 M/UL — LOW (ref 4.2–5.4)
RBC # FLD: 13.2 % — SIGNIFICANT CHANGE UP (ref 11.5–14.5)
RBC # FLD: 13.3 % — SIGNIFICANT CHANGE UP (ref 11.5–14.5)
WBC # BLD: 20.23 K/UL — HIGH (ref 4.8–10.8)
WBC # BLD: 24.15 K/UL — HIGH (ref 4.8–10.8)
WBC # FLD AUTO: 20.23 K/UL — HIGH (ref 4.8–10.8)
WBC # FLD AUTO: 24.15 K/UL — HIGH (ref 4.8–10.8)

## 2022-02-05 RX ORDER — TETANUS TOXOID, REDUCED DIPHTHERIA TOXOID AND ACELLULAR PERTUSSIS VACCINE, ADSORBED 5; 2.5; 8; 8; 2.5 [IU]/.5ML; [IU]/.5ML; UG/.5ML; UG/.5ML; UG/.5ML
0.5 SUSPENSION INTRAMUSCULAR ONCE
Refills: 0 | Status: COMPLETED | OUTPATIENT
Start: 2022-02-05 | End: 2022-02-05

## 2022-02-05 RX ORDER — INFLUENZA VIRUS VACCINE 15; 15; 15; 15 UG/.5ML; UG/.5ML; UG/.5ML; UG/.5ML
0.5 SUSPENSION INTRAMUSCULAR ONCE
Refills: 0 | Status: DISCONTINUED | OUTPATIENT
Start: 2022-02-05 | End: 2022-02-06

## 2022-02-05 RX ADMIN — Medication 600 MILLIGRAM(S): at 06:05

## 2022-02-05 RX ADMIN — Medication 975 MILLIGRAM(S): at 20:50

## 2022-02-05 RX ADMIN — Medication 1 TABLET(S): at 11:22

## 2022-02-05 RX ADMIN — Medication 975 MILLIGRAM(S): at 14:21

## 2022-02-05 RX ADMIN — Medication 975 MILLIGRAM(S): at 09:00

## 2022-02-05 RX ADMIN — Medication 600 MILLIGRAM(S): at 11:22

## 2022-02-05 RX ADMIN — Medication 600 MILLIGRAM(S): at 18:43

## 2022-02-05 RX ADMIN — Medication 975 MILLIGRAM(S): at 03:04

## 2022-02-05 RX ADMIN — Medication 975 MILLIGRAM(S): at 20:07

## 2022-02-05 RX ADMIN — SODIUM CHLORIDE 3 MILLILITER(S): 9 INJECTION INTRAMUSCULAR; INTRAVENOUS; SUBCUTANEOUS at 12:25

## 2022-02-05 RX ADMIN — SODIUM CHLORIDE 3 MILLILITER(S): 9 INJECTION INTRAMUSCULAR; INTRAVENOUS; SUBCUTANEOUS at 06:03

## 2022-02-05 RX ADMIN — Medication 975 MILLIGRAM(S): at 14:30

## 2022-02-05 RX ADMIN — Medication 975 MILLIGRAM(S): at 08:55

## 2022-02-05 RX ADMIN — Medication 600 MILLIGRAM(S): at 12:11

## 2022-02-05 NOTE — PROGRESS NOTE ADULT - ASSESSMENT
PPD#1 s/p  doing well    - hb 7.4 with fatigue - will start iron and repeat CBC later today    - Leukocytosis - no clinical signs of infection, possible acute phase reaction. Repeat this afternoon    - routine pp care

## 2022-02-06 ENCOUNTER — TRANSCRIPTION ENCOUNTER (OUTPATIENT)
Age: 27
End: 2022-02-06

## 2022-02-06 VITALS
SYSTOLIC BLOOD PRESSURE: 101 MMHG | HEART RATE: 96 BPM | RESPIRATION RATE: 18 BRPM | DIASTOLIC BLOOD PRESSURE: 55 MMHG | TEMPERATURE: 97 F

## 2022-02-06 PROCEDURE — 93970 EXTREMITY STUDY: CPT | Mod: 26

## 2022-02-06 RX ORDER — IRON SUCROSE 20 MG/ML
200 INJECTION, SOLUTION INTRAVENOUS ONCE
Refills: 0 | Status: COMPLETED | OUTPATIENT
Start: 2022-02-06 | End: 2022-02-06

## 2022-02-06 RX ADMIN — Medication 600 MILLIGRAM(S): at 07:11

## 2022-02-06 RX ADMIN — SODIUM CHLORIDE 3 MILLILITER(S): 9 INJECTION INTRAMUSCULAR; INTRAVENOUS; SUBCUTANEOUS at 14:30

## 2022-02-06 RX ADMIN — Medication 600 MILLIGRAM(S): at 00:50

## 2022-02-06 RX ADMIN — Medication 600 MILLIGRAM(S): at 00:10

## 2022-02-06 RX ADMIN — Medication 975 MILLIGRAM(S): at 03:12

## 2022-02-06 RX ADMIN — Medication 975 MILLIGRAM(S): at 08:39

## 2022-02-06 RX ADMIN — Medication 975 MILLIGRAM(S): at 14:33

## 2022-02-06 RX ADMIN — Medication 600 MILLIGRAM(S): at 06:21

## 2022-02-06 RX ADMIN — SODIUM CHLORIDE 3 MILLILITER(S): 9 INJECTION INTRAMUSCULAR; INTRAVENOUS; SUBCUTANEOUS at 07:13

## 2022-02-06 RX ADMIN — Medication 1 TABLET(S): at 11:05

## 2022-02-06 RX ADMIN — TETANUS TOXOID, REDUCED DIPHTHERIA TOXOID AND ACELLULAR PERTUSSIS VACCINE, ADSORBED 0.5 MILLILITER(S): 5; 2.5; 8; 8; 2.5 SUSPENSION INTRAMUSCULAR at 06:22

## 2022-02-06 RX ADMIN — Medication 975 MILLIGRAM(S): at 15:28

## 2022-02-06 RX ADMIN — Medication 975 MILLIGRAM(S): at 09:35

## 2022-02-06 RX ADMIN — Medication 975 MILLIGRAM(S): at 03:50

## 2022-02-06 RX ADMIN — Medication 600 MILLIGRAM(S): at 11:05

## 2022-02-06 RX ADMIN — Medication 600 MILLIGRAM(S): at 12:05

## 2022-02-06 RX ADMIN — IRON SUCROSE 110 MILLIGRAM(S): 20 INJECTION, SOLUTION INTRAVENOUS at 03:13

## 2022-02-06 RX ADMIN — SODIUM CHLORIDE 3 MILLILITER(S): 9 INJECTION INTRAMUSCULAR; INTRAVENOUS; SUBCUTANEOUS at 00:23

## 2022-02-06 NOTE — DISCHARGE NOTE OB - NS MD DC FALL RISK RISK
For information on Fall & Injury Prevention, visit: [email or web address] OR  [email or web address] OR  https://www.cdc.gov/steadi/patient.html

## 2022-02-06 NOTE — DISCHARGE NOTE OB - PATIENT PORTAL LINK FT
You can access the FollowMyHealth Patient Portal offered by [facility] by registering at the following website: [email or web address]. By joining [facility]’s FollowMyHealth portal, you will also be able to view your health information using other applications (apps) compatible with our system.

## 2022-02-06 NOTE — DISCHARGE NOTE OB - CARE PROVIDER_API CALL
Vasu Allen (MD)  Obstetrics and Gynecology  [address] [facility] [place] [address]  Phone: [phone]  Fax: [phone]  Follow Up Time:

## 2022-02-06 NOTE — DISCHARGE NOTE OB - SWOLLEN, PAINFUL HOT AREAS AND/OR STREAKS ON THE BREAST
"  Problem: General Rehab Plan of Care  Goal: Therapeutic Recreation/Music Therapy Goal  Description: The patient and/or their representative will achieve their patient-specific goals related to the plan of care.  The patient-specific goals include:      Patient will attend and participate in scheduled Therapeutic Recreation and Music Therapy group interventions. The groups will focus on assisting the patient to receive knowledge to regulate and manage distress, increase understanding of triggers and emotions, and mood elevation through recreation/art or music experiences.      1. Patient will identify personal risk factors leading to self-harming thoughts and behaviors.    2. Patient will engage in increasing the use of coping skills, problem solving, and emotional regulation.    3. Patient will enhance relationships and communication skills to create a supportive environment.    4. Patient will expand expression of feelings, needs, and concerns through nonviolent channels and relaxation techniques related to art, music, and or recreation.        Patient attended a scheduled therapeutic recreation group this morning from [id number].  Patient talked about their weekend, and shared the following coping options utilized: \"using the quiet space and playing card games.\"   Therapeutic intervention through play (board game of Sequence) addressed the following:    Decreased social isolation and withdrawal  Improved social interaction skills  Increased coping strategies   Improving social interaction skills  Increasing decision making and problem-solving skills  Improving attention skills  Improved life satisfaction  Decreased in depressive and stress related symptoms  Dia was cooperative and attitude was positive despite negativity amongst peers in this group.     Group size: 7/8  Group duration 60 minutes  Mask worn as directed   Outcome: Improving     " Statement Selected

## 2022-02-16 DIAGNOSIS — O48.0 POST-TERM PREGNANCY: ICD-10-CM

## 2022-02-16 DIAGNOSIS — D72.829 ELEVATED WHITE BLOOD CELL COUNT, UNSPECIFIED: ICD-10-CM

## 2022-02-16 DIAGNOSIS — D62 ACUTE POSTHEMORRHAGIC ANEMIA: ICD-10-CM

## 2022-02-16 DIAGNOSIS — Z23 ENCOUNTER FOR IMMUNIZATION: ICD-10-CM

## 2022-02-16 DIAGNOSIS — Z3A.41 41 WEEKS GESTATION OF PREGNANCY: ICD-10-CM

## 2023-08-30 ENCOUNTER — NON-APPOINTMENT (OUTPATIENT)
Age: 28
End: 2023-08-30

## 2023-08-31 ENCOUNTER — EMERGENCY (EMERGENCY)
Facility: HOSPITAL | Age: 28
LOS: 0 days | Discharge: HOME | End: 2023-08-31
Attending: EMERGENCY MEDICINE
Payer: COMMERCIAL

## 2023-08-31 VITALS
DIASTOLIC BLOOD PRESSURE: 58 MMHG | SYSTOLIC BLOOD PRESSURE: 122 MMHG | TEMPERATURE: 100 F | HEART RATE: 105 BPM | OXYGEN SATURATION: 96 % | WEIGHT: 125 LBS | RESPIRATION RATE: 20 BRPM

## 2023-08-31 VITALS — SYSTOLIC BLOOD PRESSURE: 103 MMHG | DIASTOLIC BLOOD PRESSURE: 60 MMHG | HEART RATE: 80 BPM

## 2023-08-31 DIAGNOSIS — R68.83 CHILLS (WITHOUT FEVER): ICD-10-CM
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
## 2023-08-31 DIAGNOSIS — R00.0 TACHYCARDIA, UNSPECIFIED: ICD-10-CM

## 2023-08-31 DIAGNOSIS — R11.2 NAUSEA WITH VOMITING, UNSPECIFIED: ICD-10-CM
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
## 2023-08-31 DIAGNOSIS — Z87.42 PERSONAL HISTORY OF OTHER DISEASES OF THE FEMALE GENITAL TRACT: ICD-10-CM

## 2023-08-31 DIAGNOSIS — T19.2XXA FOREIGN BODY IN VULVA AND VAGINA, INITIAL ENCOUNTER: ICD-10-CM

## 2023-08-31 DIAGNOSIS — Z98.890 OTHER SPECIFIED POSTPROCEDURAL STATES: Chronic | ICD-10-CM

## 2023-08-31 DIAGNOSIS — U07.1 COVID-19: ICD-10-CM

## 2023-08-31 PROBLEM — N80.9 ENDOMETRIOSIS, UNSPECIFIED: Chronic | Status: ACTIVE | Noted: 2022-02-03

## 2023-08-31 LAB
ALBUMIN SERPL ELPH-MCNC: 5 G/DL — SIGNIFICANT CHANGE UP (ref 3.5–5.2)
ALP SERPL-CCNC: 66 U/L — SIGNIFICANT CHANGE UP (ref 30–115)
ALT FLD-CCNC: 35 U/L — SIGNIFICANT CHANGE UP (ref 0–41)
ANION GAP SERPL CALC-SCNC: 10 MMOL/L — SIGNIFICANT CHANGE UP (ref 7–14)
AST SERPL-CCNC: 36 U/L — SIGNIFICANT CHANGE UP (ref 0–41)
BASOPHILS # BLD AUTO: 0.02 K/UL — SIGNIFICANT CHANGE UP (ref 0–0.2)
BASOPHILS NFR BLD AUTO: 0.4 % — SIGNIFICANT CHANGE UP (ref 0–1)
BILIRUB SERPL-MCNC: 0.3 MG/DL — SIGNIFICANT CHANGE UP (ref 0.2–1.2)
BUN SERPL-MCNC: 15 MG/DL — SIGNIFICANT CHANGE UP (ref 10–20)
CALCIUM SERPL-MCNC: 9.3 MG/DL — SIGNIFICANT CHANGE UP (ref 8.4–10.5)
CHLORIDE SERPL-SCNC: 103 MMOL/L — SIGNIFICANT CHANGE UP (ref 98–110)
CO2 SERPL-SCNC: 25 MMOL/L — SIGNIFICANT CHANGE UP (ref 17–32)
CREAT SERPL-MCNC: 0.7 MG/DL — SIGNIFICANT CHANGE UP (ref 0.7–1.5)
EGFR: 121 ML/MIN/1.73M2 — SIGNIFICANT CHANGE UP
EOSINOPHIL # BLD AUTO: 0 K/UL — SIGNIFICANT CHANGE UP (ref 0–0.7)
EOSINOPHIL NFR BLD AUTO: 0 % — SIGNIFICANT CHANGE UP (ref 0–8)
GLUCOSE SERPL-MCNC: 96 MG/DL — SIGNIFICANT CHANGE UP (ref 70–99)
HCG SERPL QL: NEGATIVE — SIGNIFICANT CHANGE UP
HCT VFR BLD CALC: 39.7 % — SIGNIFICANT CHANGE UP (ref 37–47)
HGB BLD-MCNC: 13.2 G/DL — SIGNIFICANT CHANGE UP (ref 12–16)
IMM GRANULOCYTES NFR BLD AUTO: 0.2 % — SIGNIFICANT CHANGE UP (ref 0.1–0.3)
LYMPHOCYTES # BLD AUTO: 0.33 K/UL — LOW (ref 1.2–3.4)
LYMPHOCYTES # BLD AUTO: 6.2 % — LOW (ref 20.5–51.1)
MCHC RBC-ENTMCNC: 27.6 PG — SIGNIFICANT CHANGE UP (ref 27–31)
MCHC RBC-ENTMCNC: 33.2 G/DL — SIGNIFICANT CHANGE UP (ref 32–37)
MCV RBC AUTO: 82.9 FL — SIGNIFICANT CHANGE UP (ref 81–99)
MONOCYTES # BLD AUTO: 0.6 K/UL — SIGNIFICANT CHANGE UP (ref 0.1–0.6)
MONOCYTES NFR BLD AUTO: 11.3 % — HIGH (ref 1.7–9.3)
NEUTROPHILS # BLD AUTO: 4.33 K/UL — SIGNIFICANT CHANGE UP (ref 1.4–6.5)
NEUTROPHILS NFR BLD AUTO: 81.9 % — HIGH (ref 42.2–75.2)
NRBC # BLD: 0 /100 WBCS — SIGNIFICANT CHANGE UP (ref 0–0)
PLATELET # BLD AUTO: 249 K/UL — SIGNIFICANT CHANGE UP (ref 130–400)
PMV BLD: 9.3 FL — SIGNIFICANT CHANGE UP (ref 7.4–10.4)
POTASSIUM SERPL-MCNC: 4 MMOL/L — SIGNIFICANT CHANGE UP (ref 3.5–5)
POTASSIUM SERPL-SCNC: 4 MMOL/L — SIGNIFICANT CHANGE UP (ref 3.5–5)
PROT SERPL-MCNC: 7.9 G/DL — SIGNIFICANT CHANGE UP (ref 6–8)
RBC # BLD: 4.79 M/UL — SIGNIFICANT CHANGE UP (ref 4.2–5.4)
RBC # FLD: 12.3 % — SIGNIFICANT CHANGE UP (ref 11.5–14.5)
SODIUM SERPL-SCNC: 138 MMOL/L — SIGNIFICANT CHANGE UP (ref 135–146)
WBC # BLD: 5.29 K/UL — SIGNIFICANT CHANGE UP (ref 4.8–10.8)
WBC # FLD AUTO: 5.29 K/UL — SIGNIFICANT CHANGE UP (ref 4.8–10.8)

## 2023-08-31 PROCEDURE — 96374 THER/PROPH/DIAG INJ IV PUSH: CPT

## 2023-08-31 PROCEDURE — 84703 CHORIONIC GONADOTROPIN ASSAY: CPT

## 2023-08-31 PROCEDURE — 80053 COMPREHEN METABOLIC PANEL: CPT

## 2023-08-31 PROCEDURE — 85025 COMPLETE CBC W/AUTO DIFF WBC: CPT

## 2023-08-31 PROCEDURE — 99284 EMERGENCY DEPT VISIT MOD MDM: CPT | Mod: 25

## 2023-08-31 PROCEDURE — 99284 EMERGENCY DEPT VISIT MOD MDM: CPT

## 2023-08-31 PROCEDURE — 36415 COLL VENOUS BLD VENIPUNCTURE: CPT

## 2023-08-31 RX ORDER — KETOROLAC TROMETHAMINE 30 MG/ML
15 SYRINGE (ML) INJECTION ONCE
Refills: 0 | Status: DISCONTINUED | OUTPATIENT
Start: 2023-08-31 | End: 2023-08-31

## 2023-08-31 RX ORDER — SODIUM CHLORIDE 9 MG/ML
1000 INJECTION INTRAMUSCULAR; INTRAVENOUS; SUBCUTANEOUS ONCE
Refills: 0 | Status: DISCONTINUED | OUTPATIENT
Start: 2023-08-31 | End: 2023-08-31

## 2023-08-31 RX ORDER — ONDANSETRON 8 MG/1
1 TABLET, FILM COATED ORAL
Qty: 9 | Refills: 0
Start: 2023-08-31 | End: 2023-09-02

## 2023-08-31 RX ORDER — ONDANSETRON 8 MG/1
4 TABLET, FILM COATED ORAL ONCE
Refills: 0 | Status: DISCONTINUED | OUTPATIENT
Start: 2023-08-31 | End: 2023-08-31

## 2023-08-31 RX ADMIN — Medication 15 MILLIGRAM(S): at 15:17

## 2023-08-31 NOTE — ED PROVIDER NOTE - DIFFERENTIAL DIAGNOSIS
viral infection, dehydration, metabolic vs infectious etiology Differential Diagnosis viral infection, dehydration, metabolic vs infectious etiology  foreign body

## 2023-08-31 NOTE — ED PROCEDURE NOTE - NS ED ATTENDING STATEMENT MOD
This was a shared visit with the VIJAYA. I reviewed and verified the documentation and independently performed the documented:

## 2023-08-31 NOTE — ED PROVIDER NOTE - PROGRESS NOTE DETAILS
ED Attending DAVID Desai  Patient reports feeling much better, tolerated p.o., aware symptomatic treatment, quarantine, pulse ox monitoring, signs and symptoms to return for.  Patient comfortable with plan.  Given antibiotics due to having cough and vaginal area for 2 days, aware of avoidance of using this as patient had a difficult time removing it, aware of signs and symptoms to return for.   will follow-up with PMD.

## 2023-08-31 NOTE — ED PROVIDER NOTE - NSFOLLOWUPINSTRUCTIONS_ED_ALL_ED_FT
Novel Coronavirus (COVID-19)  The Facts  What is a coronavirus?  Coronaviruses are a large family of viruses that cause illnesses ranging from the common cold  to more severe diseases such as Middle East Respiratory Syndrome (MERS) and Severe Acute  Respiratory Syndrome (SARS).  What is Novel Coronavirus (COVID-19)?  COVID-19 is a new strain of Coronavirus that has not been previously identified in humans. COVID-19  was identified in [place], [place], [place] in December 2019 (COVID-19). COVID-19 has  since been identified outside of [place], in a growing number of countries internationally, including  the [place].  Where can I find the most recent information about COVID-19?  The Centers for Disease Control and Prevention (CDC) is closely monitoring the outbreak caused by the  COVID-19. For the latest information about COVID- 19, visit the CDC website at  https://www.cdc.gov/coronavirus/index.html  How are coronaviruses spread?  Coronaviruses can be transmitted from person-to- person, usually after close contact with an infected person,  for example, in a household, workplace, or healthcare setting via droplets that become airborne after a cough  or sneeze by an affected person. These droplets can then infect a nearby person. It is likely transmission also  occurs by touching recently contaminated surfaces.  What are the symptoms of coronavirus infection?  It depends on the virus, but common signs include fever and/or respiratory symptoms such as  cough and shortness of breath. In more severe cases, infection can cause pneumonia, severe acute  respiratory syndrome, kidney failure and even death. Fortunately, most cases of COVID-19 have an  illness no different than the influenza “flu”. With a majority of these patients having mild symptoms  and overall mortality which appears to be not much different than the flu.  Is there a treatment for a COVID-19?  There is no specific treatment for disease caused by COVID-19. However, many of the symptoms can  be treated based on the patient’s clinical condition. Supportive care for infected persons can be highly  effective.  What can I do to protect myself?  Washing your hands, covering your cough, and disinfecting surfaces are the best precautionary  measures. It is also advisable to avoid close contact with anyone showing symptoms of respiratory  illness such as coughing and sneezing. Those with symptoms should wear a surgical mask when  around others.  What can I do to protect those around me?  If you have been identified as someone who may be infected with COVID-19, we recommend you  follow the self-isolation procedures outlined below to protect those around you and limit the spread  of this virus.   March 3, 2020  Recommendations for Patients Advised to Self-Isolate  for Possible COVID-19 Exposure  We recommend the below precautionary steps from now until 14 days from when you  returned from your travel or date of your last known possible contact:  - Do not go to work, school, or public areas. Avoid using public transportation, ride-sharing, or  taxis.  - As much as possible, separate yourself from other people in your home. If you can, you should  stay in a room and away from other people in your home. Also, you should use a separate  bathroom, if available.  - Wear the supplied mask whenever you are around other people.  - If you have a non-urgent medical appointment, please reschedule for a later date. If the  appointment is urgent, please call the healthcare provider and tell them that you are on selfisolation for possible COVID-19. This will help the healthcare provider’s office take steps to keep  other people from getting infected or exposed. If you can reschedule routine appointments, do  so.  - Wash your hands often with soap and water for at least 15 to 20 seconds or clean your hands  with an alcohol-based hand  that contains 60 to 95% alcohol, covering all surfaces of  your hands and rubbing them together until they feel dry. Soap and water should be used  preferentially if hands are visibly dirty.  - Cover your mouth and nose with a tissue when you cough or sneeze. Throw used tissues in a  lined trash can; immediately wash your hands.  - Avoid touching your eyes, nose, and mouth with your hands.  - Avoid sharing personal household items. You should not share dishes, drinking glasses, cups,  eating utensils, towels, or bedding with other people or pets in your home. After using these  items, they should be washed thoroughly with soap and water.  - Clean and disinfect all “high-touch” surfaces every day. High touch surfaces include counters,  tabletops, doorknobs, light switches, remote controls, bathroom fixtures, toilets, phones,  keyboards, tablets, and bedside tables. Also, clean any surfaces that may have blood, stool, or  body fluids on them.       If you develop worsening symptoms:  - If you develop worsening symptoms, such as severe shortness of breath, please call [phone] option [phone]. They will assist you in determining your next steps.  During your time on self-isolation do the following:  - Work from home if you are able to so.  - Limit social isolation by talking with friends and family on the phone or with face-time  - Talk with friends and relatives who don’t live with you about supporting each other if one  household has to be quarantined. For example, agree to drop groceries or other supplies at the  front door.  - Exercise and spend time outdoors away from others if able to do so.    Why didn’t I get tested for novel coronavirus (COVID-19)?  The number of available tests is very limited so strict rules exist for who is allowed to be tested.  [facility] has been authorized to perform testing and is currently working hard to be  able to start providing the test. Such testing is currently reserved for patients who have had  contact with someone infected with the virus, or those who are very sick a plus those who have  traveled to areas identified by the Centers for Disease Control and Prevention (CD) and will  require hospitalization.  What should I do now?  If you are well enough to be discharged home and are not in a high risk group to have  contracted the COVID-10, you should care for yourself at home exactly like you would if you  have Influenza “flu”. Follow all the standard guidelines about washing your hands, covering  your cough, etc.  You should return to the Emergency Department if you develop worse symptoms, trouble  breathing, chest pain, and/or a fever that doesn’t improve with over the counter  acetaminophen or ibuprofen.

## 2023-08-31 NOTE — ED ADULT TRIAGE NOTE - CHIEF COMPLAINT QUOTE
pt has covid, c/o vomiting, also states she put in diva cup in vagina 2 days ago and can't get it out

## 2023-08-31 NOTE — ED PROVIDER NOTE - PATIENT PORTAL LINK FT
You can access the FollowMyHealth Patient Portal offered by [facility] by registering at the following website: [email or web address]. By joining [facility]’s FollowMyHealth portal, you will also be able to view your health information using other applications (apps) compatible with our system.

## 2023-08-31 NOTE — ED ADULT TRIAGE NOTE - MODE OF ARRIVAL
I spoke with Willis - Shane was seen at [facility] ER 11/14 - all medications discontinued. Padmaja was concerned about blood pressure medication.       Shane is in Hospice he is on morphine - resting a lot and not much communication.     (from hospice nurse, blood pressure 146/118)    They are keeping Shane comfortable.    [employer] in

## 2023-08-31 NOTE — ED PROVIDER NOTE - PHYSICAL EXAMINATION
CONST: Well appearing in NAD  EYES: PERRL, EOMI, Sclera and conjunctiva clear.   ENT: No nasal discharge. Oropharynx normal appearing  NECK: Non-tender, no meningeal signs. normal ROM. supple   CARD: Normal S1 S2; Normal rate and rhythm  RESP: Equal BS B/L, No wheezes, rhonchi or rales. No distress  GI: Soft, non-tender, non-distended. no cva tenderness. normal BS  GYN: cup inside vagina, mild blood.   MS: Normal ROM in all extremities. No midline spinal tenderness. pulses 2 +. no calf tenderness or swelling  SKIN: Warm, dry, no acute rashes. Good turgor  NEURO: A&Ox3, No focal deficits.

## 2023-08-31 NOTE — ED PROVIDER NOTE - OBJECTIVE STATEMENT
28 year old female with no pmhx presents to ed with chills, nausea and vomiting since tuesday. Patient, patients son and  tested positive for covid. No  chest pain, sob, cough, abd pain, or urinary symptoms. Pt also complaining that "diva cup" is stuck in her vagina and cannot get it out. states it has been in for 2 days. no vaginal discharge.

## 2023-08-31 NOTE — ED PROVIDER NOTE - CLINICAL SUMMARY MEDICAL DECISION MAKING FREE TEXT BOX
28-year-old female with past medical history of endometriosis, states used to drink daily but stopped, smokes marijuana, child at home recently with COVID and was experiencing vomiting, patient and fiancé started to develop symptoms over the past few days of body aches and vomiting specifically patient nonbilious nonbloody, states the symptoms persisted and worsened today so came to the emergency department.  Patient also reports that she has her diva cup for her menstruation has been stuck there for 2 days and could not get it out so wanted this to also be removed.  denies fever, chills,  cp, sob, pleuritic chest pain, palpitations, diaphoresis, cough, abd pain, diarrhea, constipation, melena/brbpr, urinary symptoms, back/ flank pain, vaginal discharge/odor, sick contacts, recent travel or rash.     on exam: Non toxic appearing pt sitting on stretcher in nad, no rash, dry mm, Tachy, radial pulses 2/4 b/l, no jvd, ctabl w/ breath sounds present b/l, no wheezing or crackles,  no accessory muscle use, no tachypnea, no stridor, bs present throughout all 4 quadrants, abd soft, nd, nt,  no rebound tenderness or guarding, no cvat, pelvic exam: ext genitalia +vaginal walls pink, no pain to palpation including over bartholin glands-no swelling/inflammation, cup removed with no difficulty, pt currently menstruationg blood noted in vaginal canal, cervix intact, closed os,  no visibale masses/lesions, no pelvic tenderness on bimanual exam, uterus smooth and within normal limits, no adenxa tenderness to palpation, no CMT, FROM of ext, no edema, no calf pain/swelling/erythema, AAOx3. No edema.     Plan: Labs, ivf, pain control, reassess.     Labs were ordered and reviewed.  I Appropriate medications for patient's presenting complaints were ordered and effects were reassessed.  Patient's records (prior hospital, ED visit) were reviewed.  Additional history was obtained from sister. 28-year-old female with past medical history of endometriosis, states used to drink daily but stopped, smokes marijuana, child at home recently with COVID and was experiencing vomiting, patient and fiancé started to develop symptoms over the past few days of body aches and vomiting specifically patient nonbilious nonbloody, states the symptoms persisted and worsened today so came to the emergency department.  Patient also reports that she has her diva cup for her menstruation has been stuck there for 2 days and could not get it out so wanted this to also be removed.  denies fever, chills,  cp, sob, pleuritic chest pain, palpitations, diaphoresis, cough, abd pain, diarrhea, constipation, melena/brbpr, urinary symptoms, back/ flank pain, vaginal discharge/odor, sick contacts, recent travel or rash.     on exam: Non toxic appearing pt sitting on stretcher in nad, no rash, dry mm, Tachy, radial pulses 2/4 b/l, no jvd, ctabl w/ breath sounds present b/l, no wheezing or crackles,  no accessory muscle use, no tachypnea, no stridor, bs present throughout all 4 quadrants, abd soft, nd, nt,  no rebound tenderness or guarding, no cvat, pelvic exam: ext genitalia +vaginal walls pink, no pain to palpation including over bartholin glands-no swelling/inflammation, cup removed with no difficulty, pt currently menstruationg blood noted in vaginal canal, cervix intact, closed os,  no visibale masses/lesions, no pelvic tenderness on bimanual exam, uterus smooth and within normal limits, no adenxa tenderness to palpation, no CMT, FROM of ext, no edema, no calf pain/swelling/erythema, AAOx3. No edema.     Plan: Labs, ivf, pain control, reassess.     Labs were ordered and reviewed.  I Appropriate medications for patient's presenting complaints were ordered and effects were reassessed.  Patient's records (prior hospital, ED visit) were reviewed.  Additional history was obtained from sister. Escalation to admission/observation was considered. However patient feels much better and is comfortable with discharge.  Appropriate follow-up was arranged.  Patient reports feeling much better, tolerated p.o., aware symptomatic treatment, quarantine, pulse ox monitoring, signs and symptoms to return for.  Patient comfortable with plan.  Given antibiotics due to having cough and vaginal area for 2 days, aware of avoidance of using this as patient had a difficult time removing it, aware of signs and symptoms to return for.   will follow-up with PMD.  Supportive care and home care discussed in detail. Patient aware they may have to return for re-evaluation and possible admission if outpatient treatment fails. Strict return precautions discussed.

## 2023-08-31 NOTE — ED ADULT NURSE NOTE - NSFALLUNIVINTERV_ED_ALL_ED
Bed/Stretcher in lowest position, wheels locked, appropriate side rails in place/Call bell, personal items and telephone in reach/Instruct patient to call for assistance before getting out of bed/chair/stretcher/Non-slip footwear applied when patient is off stretcher/[place] to call system/Physically safe environment - no spills, clutter or unnecessary equipment/Purposeful proactive rounding/Room/bathroom lighting operational, light cord in reach

## 2025-05-28 NOTE — DISCHARGE NOTE OB - PROVIDER TOKENS
PROVIDER:[TOKEN:[[id number]]] Detail Level: Zone Expiration Date (Optional):  Samples Given: [employer] Lot/Batch Number (Optional): [id number]